# Patient Record
Sex: FEMALE | Race: WHITE | NOT HISPANIC OR LATINO
[De-identification: names, ages, dates, MRNs, and addresses within clinical notes are randomized per-mention and may not be internally consistent; named-entity substitution may affect disease eponyms.]

---

## 2017-12-21 ENCOUNTER — RECORDS - HEALTHEAST (OUTPATIENT)
Dept: ADMINISTRATIVE | Facility: OTHER | Age: 67
End: 2017-12-21

## 2017-12-22 ENCOUNTER — RECORDS - HEALTHEAST (OUTPATIENT)
Dept: ADMINISTRATIVE | Facility: OTHER | Age: 67
End: 2017-12-22

## 2017-12-29 ENCOUNTER — RECORDS - HEALTHEAST (OUTPATIENT)
Dept: ADMINISTRATIVE | Facility: OTHER | Age: 67
End: 2017-12-29

## 2021-05-31 ENCOUNTER — RECORDS - HEALTHEAST (OUTPATIENT)
Dept: ADMINISTRATIVE | Facility: CLINIC | Age: 71
End: 2021-05-31

## 2025-06-25 ENCOUNTER — APPOINTMENT (OUTPATIENT)
Dept: RADIOLOGY | Facility: CLINIC | Age: 75
End: 2025-06-25
Attending: STUDENT IN AN ORGANIZED HEALTH CARE EDUCATION/TRAINING PROGRAM
Payer: COMMERCIAL

## 2025-06-25 ENCOUNTER — APPOINTMENT (OUTPATIENT)
Dept: CT IMAGING | Facility: CLINIC | Age: 75
End: 2025-06-25
Attending: STUDENT IN AN ORGANIZED HEALTH CARE EDUCATION/TRAINING PROGRAM
Payer: COMMERCIAL

## 2025-06-25 ENCOUNTER — HOSPITAL ENCOUNTER (EMERGENCY)
Facility: CLINIC | Age: 75
Discharge: HOME | End: 2025-06-25
Attending: STUDENT IN AN ORGANIZED HEALTH CARE EDUCATION/TRAINING PROGRAM
Payer: COMMERCIAL

## 2025-06-25 VITALS
SYSTOLIC BLOOD PRESSURE: 185 MMHG | OXYGEN SATURATION: 95 % | HEART RATE: 91 BPM | BODY MASS INDEX: 26.65 KG/M2 | WEIGHT: 150.4 LBS | RESPIRATION RATE: 16 BRPM | DIASTOLIC BLOOD PRESSURE: 88 MMHG | HEIGHT: 63 IN | TEMPERATURE: 97.4 F

## 2025-06-25 DIAGNOSIS — S02.2XXA CLOSED FRACTURE OF NASAL BONE, INITIAL ENCOUNTER: ICD-10-CM

## 2025-06-25 DIAGNOSIS — S63.502A WRIST SPRAIN, LEFT, INITIAL ENCOUNTER: ICD-10-CM

## 2025-06-25 DIAGNOSIS — S63.501A WRIST SPRAIN, RIGHT, INITIAL ENCOUNTER: ICD-10-CM

## 2025-06-25 PROCEDURE — 72125 CT NECK SPINE W/O DYE: CPT

## 2025-06-25 PROCEDURE — 70450 CT HEAD/BRAIN W/O DYE: CPT

## 2025-06-25 PROCEDURE — 73110 X-RAY EXAM OF WRIST: CPT | Mod: LT

## 2025-06-25 PROCEDURE — 99284 EMERGENCY DEPT VISIT MOD MDM: CPT | Mod: 25 | Performed by: STUDENT IN AN ORGANIZED HEALTH CARE EDUCATION/TRAINING PROGRAM

## 2025-06-25 PROCEDURE — 70486 CT MAXILLOFACIAL W/O DYE: CPT

## 2025-06-25 ASSESSMENT — ACTIVITIES OF DAILY LIVING (ADL)
ADLS_ACUITY_SCORE: 41

## 2025-06-25 ASSESSMENT — COLUMBIA-SUICIDE SEVERITY RATING SCALE - C-SSRS
6. HAVE YOU EVER DONE ANYTHING, STARTED TO DO ANYTHING, OR PREPARED TO DO ANYTHING TO END YOUR LIFE?: NO
1. IN THE PAST MONTH, HAVE YOU WISHED YOU WERE DEAD OR WISHED YOU COULD GO TO SLEEP AND NOT WAKE UP?: NO
2. HAVE YOU ACTUALLY HAD ANY THOUGHTS OF KILLING YOURSELF IN THE PAST MONTH?: NO

## 2025-06-25 NOTE — ED PROVIDER NOTES
EMERGENCY DEPARTMENT ENCOUNTER      NAME: Betty Turner  AGE: 75 year old female  YOB: 1950  MRN: [id number]  EVALUATION DATE & TIME: 6/25/2025  1:18 AM    PCP: Bryson Dubois    ED PROVIDER: Luis Manuel Chicas M.D.      Chief Complaint   Patient presents with    Fall    Facial Laceration    Wrist Pain     bilateral         FINAL IMPRESSION:  1. Closed fracture of nasal bone, initial encounter    2. Wrist sprain, right, initial encounter    3. Wrist sprain, left, initial encounter          ED COURSE & MEDICAL DECISION MAKING:    Pertinent Labs & Imaging studies reviewed. (See chart for details)  75 year old female presents to the Emergency Department for evaluation of fall.  She had a small abrasion and bruising to her nose there was concern for facial fracture also concern for the possibility intracranial injury or cervical injury based on mechanism.  Patient is not take blood thinners.  CT scan of the head face and neck are normal other than a small nondisplaced crack in her anterior septum.  Patient did not have significant bleeding from the nares so no intervention was needed there.  She had a small fairly superficial abrasion to the bridge of her nose which did not require repair.  She had some bruising along her left wrist along the scaphoid and to the right wrist but I do not believe there is evidence of scaphoid fracture although I did discuss with her that sometimes the x-ray could show a fracture on repeat x-ray.  She will wear the splint she has at home.  Will have her follow-up with her primary doctor in the next week or so    At the conclusion of the encounter I discussed the results of all of the tests and the disposition. The questions were answered. The patient or family acknowledged understanding and was agreeable with the care plan.     ED Course as of 06/25/25 0446   Wed Jun 25, 2025   0159 I met with the patient, obtained history, performed an initial exam, and discussed options  and plan for diagnostics and treatment here in the ED.    0332 We discussed the plan for discharge and the patient is agreeable. Reviewed supportive cares, symptomatic treatment, outpatient follow up, and reasons to return to the Emergency Department. Patient to be discharged by ED RN.             Medical Decision Making    Discharge. No recommendations on prescription strength medication(s). See documentation for any additional details.    MIPS (CTPE, Dental pain, [place], Sinusitis, Asthma/COPD, Head Trauma): Not Applicable    SEPSIS: None                  This patient involved a high degree of complexity in medical decision making, as significant risks were present and assessed. Recent encounters & results in medical record reviewed by me.    Obtained History From: The patient and the patient's   Reviewed EMR: N/A  Care Impacted by Chronic Illness: N/A  Additional Work up Considered   Independently Interpreted   Discussed Care with Another provider               MEDICATIONS GIVEN IN THE EMERGENCY:  Medications - No data to display    NEW PRESCRIPTIONS STARTED AT TODAY'S ER VISIT  There are no discharge medications for this patient.         =================================================================    HPI    Patient information was obtained from: Patient and the patient's     Use of : N/A        Betty Turner is a 75 year old female with no pertinent history who presents for evaluation of injuries after a fall.    Per patient and the patient's , around 1.5 hours ago this morning (06/25/2025) she slipped and fell in the bathroom. She fell face first onto the tile and thinks she tried bracing herself by putting her hands forward. Here in the ED she has pain in her mouth, nose, and both wrists. Also, she had a bloody nose but that seems to have slowed down since it started after the fall. She does have a history of a broken left wrist.    She had no LOC. Currently she is not  on a blood thinner. She did not mention taking anything before coming in today. No daily medications were mentioned.      REVIEW OF SYSTEMS   Review of Systems All systems reviewed are negative unless noted positive in the HPI. Pertinent negatives are also noted in the HPI.     PAST MEDICAL HISTORY:  No past medical history on file.    PAST SURGICAL HISTORY:  Past Surgical History:   Procedure Laterality Date    IR LUMBAR EPIDURAL STEROID INJECTION  4/24/2008           CURRENT MEDICATIONS:    No current outpatient medications on file.      ALLERGIES:  Allergies   Allergen Reactions    Sulfa Antibiotics Swelling     Facial swelling       FAMILY HISTORY:  No family history on file.    SOCIAL HISTORY:   Social History     Socioeconomic History    Marital status:    Tobacco Use    Smoking status: Never   Substance and Sexual Activity    Alcohol use: Yes     Comment: Alcoholic Drinks/day: occasional    Drug use: No     Social Drivers of Health     Financial Resource Strain: Low Risk  (6/9/2025)    [facility]    Financial Resource Strain     Difficulty of Paying Living Expenses: 3   Food Insecurity: No Food Insecurity (6/9/2025)    Received from [facility]    Food Insecurity     Do you worry your food will run out before you are able to buy more?: 1   Transportation Needs: No Transportation Needs (6/9/2025)    Received from [facility]    Transportation Needs     Does lack of transportation keep you from medical appointments?: 1     Does lack of transportation keep you from work, meetings or getting things that you need?: 1   Social Connections: Socially Integrated (6/9/2025)    Received from [facility]    Social Connections     Do you often feel lonely or isolated from those around you?: 0   Housing Stability: Low Risk  (6/9/2025)    [facility]    Housing Stability     What is your housing situation today?: 1       VITALS:  BP (!) 185/88   Pulse 91   Temp 97.4  F (36.3  C) (Oral)   Resp 16   Ht 1.6 m (5' 3\")   Wt 68.2 kg (150 lb 6.4 oz)   SpO2 95%   BMI 26.64 kg/m        PHYSICAL EXAM    VITAL SIGNS: BP (!) 185/88   Pulse 91   Temp 97.4  F (36.3  C) (Oral)   Resp 16   Ht 1.6 m (5' 3\")   Wt 68.2 kg (150 lb 6.4 oz)   SpO2 95%   BMI 26.64 kg/m    Constitutional:  Well developed, well nourished    EYES: Conjunctivae clear, no discharge  HENT: Atraumatic, normocephalic, bilateral external ears normal.  Oropharynx moist. Nose normal. Superficial laceration to bridge of the nose with mild swelling and no deformity. Mid face stable, teeth intact, mild swelling to upper lip with ecchymoses/bruising.  Neck: Normal ROM , Supple   Respiratory:  No respiratory distress, normal nonlabored respirations.   Cardiovascular:  Distal perfusion appears intact  Musculoskeletal:  No edema appreciated, No cyanosis, No clubbing. Good range of motion in all major joints. Base of sepaloid on the left wrist had swelling extending to the distal wrist. Mild pain along the right proximal dorsal portion of the hand.  Integument:  Warm, Dry, No erythema, No rash.   Neurologic:  Alert and oriented. No focal deficits noted.  Ambulatory  Psychiatric:  Affect normal           LAB:  All pertinent labs reviewed and interpreted.  Labs Ordered and Resulted from Time of ED Arrival to Time of ED Departure - No data to display    RADIOLOGY:  Reviewed all pertinent imaging. Please see official radiology report.  XR Wrist Right G/E 3 Views   Final Result   IMPRESSION: Moderate degenerative osteophytic changes the first care home joint are noted. Degenerative osteophytic changes of the scaphoid trapezial and intercarpal joints are also noted. No acute fracture or malalignment.      XR Wrist Left G/E 3 Views   Final Result   IMPRESSION: Plate and screw fixation of " the distal radius without evidence of acute fracture or hardware complication. Degenerative changes of the wrist.      CT Facial Bones without Contrast   Final Result   IMPRESSION:   HEAD CT:   1.  No acute intracranial abnormality or significant change compared to the prior study.      FACIAL BONE CT:   1. Acute nasal bone deformities. Possible subtle acute nasal septal fracture.      CERVICAL SPINE CT:   1.  No acute cervical spine fracture.      Head CT w/o contrast   Final Result   IMPRESSION:   HEAD CT:   1.  No acute intracranial abnormality or significant change compared to the prior study.      FACIAL BONE CT:   1. Acute nasal bone deformities. Possible subtle acute nasal septal fracture.      CERVICAL SPINE CT:   1.  No acute cervical spine fracture.      CT Cervical Spine w/o Contrast   Final Result   IMPRESSION:   HEAD CT:   1.  No acute intracranial abnormality or significant change compared to the prior study.      FACIAL BONE CT:   1. Acute nasal bone deformities. Possible subtle acute nasal septal fracture.      CERVICAL SPINE CT:   1.  No acute cervical spine fracture.            I, Alexander Conley, am serving as a scribe to document services personally performed by Dr. Luis Manuel Chicas based on my observation and the provider's statements to me. I, Luis Manuel Chicas MD attest that Alexander Conley is acting in a scribe capacity, has observed my performance of the services and has documented them in accordance with my direction.    Luis Manuel Chicas M.D.  Emergency Medicine  [facility] EMERGENCY ROOM  [address] [facility] [address]  [phone]  Dept: [phone]      Luis Manuel Chicas MD  06/25/25 0216

## 2025-06-25 NOTE — ED TRIAGE NOTES
Pt reports that PTA she had a fall while going into the BR. PT denies any thinners, denies LOC. Pt sustained a nose injury with laceration to the bridge of her nose. PT also c/o bilateral wrist pain from the fall. Left greater than right. Pt has some swelling and slight ecchymossis to the left wrist. Painful to move.     Triage Assessment (Adult)       Row Name 06/25/25 0122          Triage Assessment    Airway WDL WDL        Respiratory WDL    Respiratory WDL WDL        Skin Circulation/Temperature WDL    Skin Circulation/Temperature WDL WDL        Cardiac WDL    Cardiac WDL WDL        Peripheral/Neurovascular WDL    Peripheral Neurovascular WDL X;neurovascular assessment upper        Cognitive/Neuro/Behavioral WDL    Cognitive/Neuro/Behavioral WDL WDL        LUE Neurovascular Assessment    Color LUE other (see comments)  ecchymotic     Sensation LUE tenderness present;no numbness;no tenderness        RUE Neurovascular Assessment    Temperature RUE warm     Color RUE no discoloration     Sensation RUE tenderness present;no tingling;no numbness                      .